# Patient Record
Sex: FEMALE | Race: WHITE
[De-identification: names, ages, dates, MRNs, and addresses within clinical notes are randomized per-mention and may not be internally consistent; named-entity substitution may affect disease eponyms.]

---

## 2017-05-16 ENCOUNTER — HOSPITAL ENCOUNTER (EMERGENCY)
Dept: HOSPITAL 47 - EC | Age: 77
Discharge: HOME | End: 2017-05-16
Payer: MEDICARE

## 2017-05-16 VITALS
RESPIRATION RATE: 16 BRPM | TEMPERATURE: 97 F | HEART RATE: 83 BPM | SYSTOLIC BLOOD PRESSURE: 133 MMHG | DIASTOLIC BLOOD PRESSURE: 72 MMHG
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
## 2017-05-16 DIAGNOSIS — Z79.899: ICD-10-CM

## 2017-05-16 DIAGNOSIS — S60.511A: ICD-10-CM

## 2017-05-16 DIAGNOSIS — S80.01XA: Primary | ICD-10-CM
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
## 2017-05-16 DIAGNOSIS — Z79.82: ICD-10-CM

## 2017-05-16 DIAGNOSIS — E78.5: ICD-10-CM

## 2017-05-16 DIAGNOSIS — I10: ICD-10-CM

## 2017-05-16 DIAGNOSIS — M19.90: ICD-10-CM

## 2017-05-16 DIAGNOSIS — S60.512A: ICD-10-CM

## 2017-05-16 DIAGNOSIS — S00.33XA: ICD-10-CM

## 2017-05-16 PROCEDURE — 99283 EMERGENCY DEPT VISIT LOW MDM: CPT

## 2017-05-16 NOTE — XR
EXAMINATION TYPE: XR knee complete RT

 

DATE OF EXAM: 5/16/2017 2:17 PM

 

CLINICAL HISTORY: pain

 

TECHNIQUE:  Three views of the right knee are obtained.

 

COMPARISON: None.

 

FINDINGS:  There is no acute fracture/dislocation.  The tri-compartment joint spaces appear moderatel
y narrowed. Meniscal chondrocalcinosis noted. Mild spur formation seen. The overlying soft tissue zee
ears unremarkable.

 

IMPRESSION:  There is no acute fracture or dislocation.ICD 10 NO FRACTURE, INITIAL EVALUATION

## 2017-05-16 NOTE — ED
Fall HPI





- General


Chief Complaint: Fall


Stated Complaint: Fall yesterday Rt knee and face


Time Seen by Provider: 05/16/17 14:34


Source: patient, RN notes reviewed


Mode of arrival: ambulatory





- History of Present Illness


Initial Comments: 





76-year-old female presents emergency Department with chief complaint of fall.  

She states that she tripped over an uneven sidewalk.  She states her is about a 

2 inch elevation.  She states she fell forward and has some abrasions to her 

hands but complaint of right knee pain, facial abrasion.  Patient states she 

didn't bite her upper lip with her teeth but states she does not feel any loose 

dentition.  She states that she had some minor bleeding.  Her tetanus is up-to-

date.  Patient states she had very minimal pain of her nose denies any headache

, dizziness, blurred vision.  Denies any neck or back pain at this time.  She 

states she has full range of motion of her wrist and minimally tender.  Patient 

states it is very painful to walk on her right knee though.





- Related Data


 Home Medications











 Medication  Instructions  Recorded  Confirmed


 


Aspirin [Adult Low Dose Aspirin EC] 81 mg PO DAILY 12/21/16 05/16/17


 


Atorvastatin [Lipitor] 20 mg PO AS DIRECTED 12/21/16 05/16/17


 


Cholecalciferol [Vitamin D3] 1,000 unit PO DAILY 12/21/16 05/16/17


 


Cyanocobalamin (Vitamin B-12) 2,000 mcg PO DAILY 12/21/16 05/16/17





[Vitamin B-12]   


 


Ibuprofen [Advil] 200 mg PO Q8HR PRN 12/21/16 05/16/17


 


Potassium Chloride [Klor-Con 20] 20 meq PO AS DIRECTED 12/21/16 05/16/17


 


Triamterene-Hctz 37.5-25Mg 1 cap PO DAILY 12/21/16 05/16/17





[Dyazide 37.5-25 Capsule]   


 


amLODIPine [Norvasc] 5 mg PO HS 12/21/16 05/16/17


 


Acetaminophen Tab [Tylenol Tab] 325 mg PO Q4H PRN 12/29/16 05/16/17











 Allergies











Allergy/AdvReac Type Severity Reaction Status Date / Time


 


No Known Allergies Allergy   Verified 05/16/17 13:53














Review of Systems


ROS Statement: 


Those systems with pertinent positive or pertinent negative responses have been 

documented in the HPI.





ROS Other: All systems not noted in ROS Statement are negative.





Past Medical History


Past Medical History: Hyperlipidemia, Hypertension, Osteoarthritis (OA), Sleep 

Apnea/CPAP/BIPAP


History of Any Multi-Drug Resistant Organisms: None Reported


Past Surgical History: Hysterectomy


Additional Past Surgical History / Comment(s): Colonoscopy


Past Anesthesia/Blood Transfusion Reactions: No Reported Reaction


Past Psychological History: No Psychological Hx Reported


Smoking Status: Never smoker


Past Alcohol Use History: Rare


Past Drug Use History: None Reported





- Past Family History


  ** Father


Family Medical History: Cancer





General Exam


Limitations: no limitations


General appearance: alert, in no apparent distress


Head exam: Present: atraumatic, normocephalic, normal inspection


Eye exam: Present: normal appearance, PERRL, EOMI.  Absent: scleral icterus, 

conjunctival injection, periorbital swelling


ENT exam: Present: normal oropharynx, mucous membranes moist, TM's normal 

bilaterally, normal external ear exam.  Absent: normal exam (Small abrasion 

noted on the nasal bridge with no tenderness)


Neck exam: Present: normal inspection, full ROM.  Absent: tenderness, 

meningismus, lymphadenopathy


Respiratory exam: Present: normal lung sounds bilaterally.  Absent: respiratory 

distress, wheezes, rales, rhonchi, stridor


Cardiovascular Exam: Present: regular rate, normal rhythm, normal heart sounds.

  Absent: systolic murmur, diastolic murmur, rubs, gallop, clicks


Extremities exam: Present: other (Bilateral wrists and hands neurovascular 

intact full range of motion minimal tenderness there is small abrasion noted no 

obvious deformity right knee there is tenderness along the patella and inferior 

to the patella with mild swelling no ecchymosis no abrasion patient full range 

of motion no laxity)


Back exam: Present: full ROM.  Absent: tenderness


Neurological exam: Present: alert, oriented X3, CN II-XII intact, reflexes 

normal.  Absent: motor sensory deficit





Course


 Vital Signs











  05/16/17





  13:48


 


Temperature 97.0 F L


 


Pulse Rate 83


 


Respiratory 16





Rate 


 


Blood Pressure 133/72


 


O2 Sat by Pulse 99





Oximetry 














Medical Decision Making





- Medical Decision Making





76-year-old female presented for fall.  Patient has no acute fracture or knee 

she had multiple contusions and abrasions.  Patientto head injury.  Patient be 

discharged at this time return parameters were discussed.





Disposition


Clinical Impression: 


 Fall, Nasal contusion, Knee contusion, Abrasion hand





Disposition: HOME SELF-CARE


Condition: Stable


Instructions:  Contusion in Adults (ED)


Additional Instructions: 


Please return to the Emergency Department if symptoms worsen or any other 

concerns.


Referrals: 


Loly Interiano DO [Primary Care Provider] - 1-2 days


Time of Disposition: 14:41

## 2017-12-28 ENCOUNTER — HOSPITAL ENCOUNTER (OUTPATIENT)
Dept: HOSPITAL 47 - RADMAMWWP | Age: 77
Discharge: HOME | End: 2017-12-28
Payer: MEDICARE

## 2017-12-28 DIAGNOSIS — Z12.31: Primary | ICD-10-CM

## 2017-12-28 PROCEDURE — 77063 BREAST TOMOSYNTHESIS BI: CPT

## 2017-12-29 NOTE — MM
Reason for exam: screening  (asymptomatic).

Last mammogram was performed 1 year ago.



History:

Patient is postmenopausal.

Took hormonal contraceptives for 2 years.



Physical Findings:

A clinical breast exam by your physician is recommended on an annual basis and 

results should be correlated with mammographic findings.



MG 3D Screening Mammo W/Cad

Bilateral CC and MLO view(s) were taken.

Prior study comparison: December 16, 2016, mammogram, performed at [facility].  December 14, 2015, mammogram, performed at [facility].

There are scattered fibroglandular densities.  No suspicious abnormality.  No 

significant changes when compared with prior studies.





ASSESSMENT: Negative, BI-RAD 1



RECOMMENDATION:

Routine screening mammogram of both breasts in 1 year.

## 2018-10-24 ENCOUNTER — HOSPITAL ENCOUNTER (OUTPATIENT)
Dept: HOSPITAL 47 - RADMRIMAIN | Age: 78
Discharge: HOME | End: 2018-10-24
Payer: MEDICARE

## 2018-10-24 DIAGNOSIS — M99.74: ICD-10-CM

## 2018-10-24 DIAGNOSIS — M51.27: ICD-10-CM

## 2018-10-24 DIAGNOSIS — M99.73: ICD-10-CM

## 2018-10-24 DIAGNOSIS — M47.817: ICD-10-CM

## 2018-10-24 DIAGNOSIS — M48.061: Primary | ICD-10-CM

## 2018-10-24 PROCEDURE — 72148 MRI LUMBAR SPINE W/O DYE: CPT

## 2018-10-24 NOTE — MR
EXAMINATION TYPE: MR lumbar spine wo con

 

DATE OF EXAM: 10/24/2018

 

COMPARISON: NONE

 

HISTORY: Sciatica, unspecified side per order, pain in lower back and both legs x8 months per patient
.

 

TECHNIQUE: Multiplanar, multisequence imaging of the lumbar spine is performed without IV contrast.

 

FINDINGS: Sagittal images of the lumbar spine show vertebral body heights and alignment to appear sat
isfactory. Multilevel disc desiccation is present. There is multilevel moderate to advanced disc spac
e narrowing. Multilevel small posterior disc herniations are seen on sagittal images. The conus medul
julius is normal in position and signal ending at mid L1 level. Heterogeneous predominantly Modic type
 II endplate changes with moderate multilevel anterior spurring is present, scattered small hemangiom
as are also noted.

 

Axial images at the T12-L1 level is felt to appear within normal limits.

 

Axial images at L1-L2 level shows mild/moderate broad-based posterior disc protrusion mildly effacing
 the anterior thecal sac and causing moderate left and mild right-sided neural foraminal narrowing.

 

Axial images at the L2-L3 level mild/moderate broad-based disc bulge mildly effacing the anterior the
helen sac and causing mild bilateral anterior inferior neural foraminal narrowing.

 

Axial images at the L3-L4 level shows mild to moderate right greater than left facet degenerative luke
nge and ligament flavum hypertrophy with mild to moderate broad-based posterior disc protrusion mildl
y effacing the anterior thecal sac and causing moderate to advanced right and mild to moderate left-s
ided neural foraminal narrowing. Encroachment of right L3 nerve is suspected on axial image 14 and sa
gittal image 11.

 

Axial images at L4-L5 level shows moderate facet degenerative changes and ligamentum flavum hypertrop
hy. There is prominence of epidural fat at this level. There is broad disc bulge with central disc pr
otrusion seen. There is moderate to severe bilateral neural foraminal narrowing at this level noted.

 

Axial images at L5-S1 level shows moderate facet degenerative changes bilaterally. There is broad-bas
ed disc bulge seen but spinal canal is more prominent with diminished epidural fat. There is mild to 
moderate bilateral neural foraminal narrowing at this level.

 

There are suspected prominent parapelvic cysts centrally in both kidneys and partial visualization of
 greater than 1 cm cystic lesion upper right pelvis axial image 2.

 

IMPRESSION:

1. Multilevel degenerative changes in the lumbar spine as detailed above. Some epidural lipomatosis n
oted lower lumbar levels.

2. Partial visualization of greater than 1 cm cystic upper right pelvis lesion presumed right ovarian
 in etiology, this is abnormal finding in postmenopausal female and further investigation with pelvic
 ultrasound is advised.

## 2019-09-04 ENCOUNTER — HOSPITAL ENCOUNTER (OUTPATIENT)
Dept: HOSPITAL 47 - RADMAMWWP | Age: 79
Discharge: HOME | End: 2019-09-04
Attending: FAMILY MEDICINE
Payer: MEDICARE

## 2019-09-04 DIAGNOSIS — Z12.31: Primary | ICD-10-CM

## 2019-09-04 PROCEDURE — 77063 BREAST TOMOSYNTHESIS BI: CPT

## 2019-09-04 PROCEDURE — 77067 SCR MAMMO BI INCL CAD: CPT

## 2019-09-05 NOTE — MM
Reason for exam: screening  (asymptomatic).

Last mammogram was performed 1 year and 8 months ago.



History:

Patient is postmenopausal.

Took hormonal contraceptives for 2 years.



Physical Findings:

A clinical breast exam by your physician is recommended on an annual basis and 

results should be correlated with mammographic findings.



MG 3D Screening Mammo W/Cad

Bilateral CC and MLO view(s) were taken.

Prior study comparison: December 28, 2017, bilateral MG 3d screening mammo w/cad. 

December 16, 2016, mammogram, performed at [facility].

The breast tissue is heterogeneously dense. This may lower the sensitivity of 

mammography.  Benign appearing bilateral calcifications. New 3mm group of 

calcifications 5.5cm from nipple in the central upper right breast at middle 

depth. No suspicious abnormality.





ASSESSMENT: Incomplete: need additional imaging evaluation, BI-RAD 0



RECOMMENDATION:

Special view mammogram of the right breast.



[facility] will attempt to contact patient to return for supplemental 

views.

## 2019-09-20 ENCOUNTER — HOSPITAL ENCOUNTER (OUTPATIENT)
Dept: HOSPITAL 47 - RADMAMWWP | Age: 79
Discharge: HOME | End: 2019-09-20
Attending: FAMILY MEDICINE
Payer: MEDICARE

## 2019-09-20 DIAGNOSIS — R92.8: Primary | ICD-10-CM

## 2019-09-20 PROCEDURE — 77065 DX MAMMO INCL CAD UNI: CPT

## 2019-09-20 PROCEDURE — 77061 BREAST TOMOSYNTHESIS UNI: CPT

## 2019-09-23 NOTE — MM
Reason for exam: additional evaluation requested from abnormal screening.

Last mammogram was performed 1 month ago.



History:

Patient is postmenopausal and history of other cancer.

Took hormonal contraceptives for 2 years.



Physical Findings:

Nurse did not find any significant physical abnormalities on exam.



MG 3D Work Up W/Cad RT

CC with magnification, LM with magnification, and LM view(s) were taken of the 

right breast.

Prior study comparison: September 4, 2019, bilateral MG 3d screening mammo w/cad. 

December 28, 2017, bilateral MG 3d screening mammo w/cad.

Finding: There are grouped/clustered, fine calcifications in the middle position 

of the right breast.

New finding since December 28, 2017.



These results were verbally communicated with the patient and result sheet given 

to the patient on 9/20/19.





ASSESSMENT: Suspicious, BI-RAD 4



RECOMMENDATION:

Stereotactic core biopsy of the right breast.



Called Dr. Interiano with mammographic findings and has scheduled an appointment for

the patient for 10/17/19 at 4:00 with Dr. Vines.

Biopsy scheduled for 10/31/19 at 8:00.



PRELIMINARY REPORT CALLED AND FAXED TO DR. VINES ON 9/20/19.

## 2019-10-17 ENCOUNTER — HOSPITAL ENCOUNTER (OUTPATIENT)
Dept: HOSPITAL 47 - WWCWWP | Age: 79
Discharge: HOME | End: 2019-10-17
Attending: SURGERY
Payer: MEDICARE

## 2019-10-17 VITALS — BODY MASS INDEX: 32.8 KG/M2

## 2019-10-17 VITALS
RESPIRATION RATE: 18 BRPM | HEART RATE: 87 BPM | SYSTOLIC BLOOD PRESSURE: 119 MMHG | DIASTOLIC BLOOD PRESSURE: 72 MMHG | TEMPERATURE: 97.9 F

## 2019-10-17 DIAGNOSIS — Z53.9: Primary | ICD-10-CM

## 2019-10-17 NOTE — P.GSHP
History of Present Illness


H&P Date: 10/17/19


Chief Complaint: mammographic abnormality right breast





 Lori is a 79-year-old white female who had a routine screening mammogram 

performed on 9419.  She was called back to have additional views of the right 

breast and these were performed on [address].  A group of fine calcifications in 

the middle position of the right breast were noted.  These were felt to be 

suspicious and stereotactic core biopsy was recommended.  The patient does not 

feel anything of concern in her breasts.  She has no history of any trauma or 

infection in the breast.  She has no complaints of any lumps or masses in her 

breasts.  No abnormal nipple discharge.





Family history:


1. father: skin melanoma and bony cancer


2. patient: skin cancer, basal and SCC


3. maternal cousin: breast cancer 





Hormonal History:


menarche: 14


, 1 miscarriage


breast fed: none


age at first birth: 20


menopause: hysterectomy left one ovary, no cancer


BCP: 4 years


hormones: 2 years








Surgical history:


1.  Hysterectomy 1 ovary removed one left 








medical history:


1. HTN


2. high cholesterol


3. arthritis





Social history:


Smoke: Negative:


Alcohol: Occasional


Drugs: Negative

















- Constitutional


Constitutional: Denies chills, Denies fever





- EENT


Comment: 





wears glasses


Ears: deny: decreased hearing, tinnitus


Ears, nose, mouth and throat: Denies headache, Denies sore throat





- Breasts


Breasts: bilateral: as per HPI





- Cardiovascular


Cardiovascular: Reports high blood pressure, Denies chest pain, Denies shortness

of breath





- Respiratory


Respiratory: Denies cough, Denies 7





- Gastrointestinal


Gastrointestinal: Denies abdominal pain, Denies diarrhea, Denies nausea, Denies 

vomiting





- Genitourinary (Female)


Genitourinary: Denies dysuria, Denies hematuria





- Menstruation


Menstruation: Reports post hysterectomy





- Musculoskeletal


Comment: 





arthritis





- Integumentary


Integumentary: Denies pruritus, Denies rash





- Neurological


Neurological: Denies numbness, Denies weakness





- Psychiatric


Psychiatric: Denies anxiety, Denies depression





- Endocrine


Endocrine: Denies fatigue, Denies weight change





- Hematologic/Lymphatic


Comment: 


baby aspirin





- Allergic/Immunologic


Allergic/Immunologic: Reports as per HPI





Past Medical History


Past Medical History: Hyperlipidemia, Hypertension, Osteoarthritis (OA), Sleep 

Apnea/CPAP/BIPAP


History of Any Multi-Drug Resistant Organisms: None Reported


Past Surgical History: Hysterectomy


Additional Past Surgical History / Comment(s): Colonoscopy


Past Anesthesia/Blood Transfusion Reactions: No Reported Reaction


Past Psychological History: No Psychological Hx Reported


Smoking Status: Never smoker


Past Alcohol Use History: Rare


Past Drug Use History: None Reported





- Past Family History


  ** Father


Family Medical History: Cancer





Medications and Allergies


                                Home Medications











 Medication  Instructions  Recorded  Confirmed  Type


 


Aspirin [Adult Low Dose Aspirin EC] 81 mg PO DAILY 12/21/16 10/17/19 History


 


Atorvastatin [Lipitor] 20 mg PO AS DIRECTED 12/21/16 10/17/19 History


 


Cholecalciferol [Vitamin D3] 1,000 unit PO DAILY 12/21/16 10/17/19 History


 


Cyanocobalamin (Vitamin B-12) 2,000 mcg PO DAILY 12/21/16 10/17/19 History





[Vitamin B-12]    


 


Ibuprofen [Advil] 200 mg PO Q8HR PRN 12/21/16 10/17/19 History


 


Potassium Chloride [Klor-Con 20] 20 meq PO AS DIRECTED 12/21/16 10/17/19 History


 


amLODIPine [Norvasc] 10 mg PO HS 12/21/16 10/17/19 History


 


Acetaminophen Tab [Tylenol Tab] 325 mg PO Q4H PRN 12/29/16 10/17/19 History


 


Amoxicillin 500 mg PO QID 10/17/19 10/17/19 History


 


Losartan/Hydrochlorothiazide 1 tab PO DAILY 10/17/19 10/17/19 History





[Losartan-Hctz 100-25 mg Tab]    








                                    Allergies











Allergy/AdvReac Type Severity Reaction Status Date / Time


 


No Known Allergies Allergy   Verified 10/17/19 16:03














Surgical - Exam


                                   Vital Signs











Temp Pulse Resp BP Pulse Ox


 


 97.9 F   87   18   119/72   95 


 


 10/17/19 16:00  10/17/19 16:00  10/17/19 16:00  10/17/19 16:00  10/17/19 16:00














BMI 32.9





- General


well developed, well nourished, no distress





- Eyes


normal ocular movement, no icteric





- ENT


no hearing loss, no congestion





- Neck


no masses, trachea midline





- Respiratory


normal expansion, normal respiratory effort, clear to auscultation





- Cardiovascular


Rhythm: regular


Heart Sounds: normal: S1, S2





- Abdomen


Abdomen: soft, non tender, no guarding, no rigid, no rebound





- Integumentary





ormal turgor





- Neurologic


no disoriented, no combative





- Musculoskeletal


normal gait, normal posture





- Psychiatric


oriented to time, oriented to person, oriented to place, speech is normal, 

memory intact





Breast examination:


Right breast: Multi-positional exam fibrocystic changes no dominant masses or 

nodules of concern


Right axilla: No adenopathy of concern


Left breast: Multiple positional exam no dominant masses or nodules of concern


Left axilla: No adenopathy of concern





Results





Mammogram results reviewed





Assessment and Plan


Assessment: 





Impression:


1. HTN


2. high cholesterol


3. arthritis


4.  Fibrocystic breast changes


5.  Mammographic abnormality right breast


6.  Family history of breast cancer


7.  Family history of cancer





Plan:


1.  Stereotactic core biopsy right breast


2.  Medical management of medical conditions





Lori had an infection near a left posterior molar.  She has been seen by a 

dentist lanced it and put on an antibiotic.  She states she is going for suture 

removal next week and she will be completed with her antibiotics.  We have 

talked about if this is active infection would consider postponing the core 

biopsy.  Additionally we have talked about stopping her baby aspirin which she 

will do a week prior to the procedure.





Risks and benefits of procedure discussed with the patient and she wishes to 

proceed.


Cc:Dr. Interiano

## 2019-10-31 ENCOUNTER — HOSPITAL ENCOUNTER (OUTPATIENT)
Dept: HOSPITAL 47 - RADMAMWWP | Age: 79
Discharge: HOME | End: 2019-10-31
Attending: SURGERY
Payer: MEDICARE

## 2019-10-31 VITALS — RESPIRATION RATE: 16 BRPM

## 2019-10-31 VITALS — BODY MASS INDEX: 32.3 KG/M2

## 2019-10-31 VITALS — SYSTOLIC BLOOD PRESSURE: 131 MMHG | HEART RATE: 59 BPM | DIASTOLIC BLOOD PRESSURE: 71 MMHG | TEMPERATURE: 98.1 F

## 2019-10-31 DIAGNOSIS — N60.11: Primary | ICD-10-CM

## 2019-10-31 PROCEDURE — 19081 BX BREAST 1ST LESION STRTCTC: CPT

## 2019-10-31 PROCEDURE — 88305 TISSUE EXAM BY PATHOLOGIST: CPT

## 2019-10-31 NOTE — P.OP
Date of Procedure: 10/31/19


Preoperative Diagnosis: 


Mammographic abnormality right breast


Postoperative Diagnosis: 


same


Procedure(s) Performed: 


Right breast stereotactic core biopsy


Anesthesia: local


Surgeon: Sandra Mccray


Estimated Blood Loss (ml): 0.02


Pathology: other (Breast tissue)


Condition: stable


Disposition: same day


Indications for Procedure: 


right breast grouped clustered fine calcifications in the middle position


Operative Findings: 


Calcifications noted in specimen


Description of Procedure: 


The patient is a 79-year-old white female who on a mammogram was noted to have 

fine group/clustered calcifications in the middle position of the right breast. 

The patient was recommended to undergo stereotactic core biopsy.  Risks and 

benefits of the procedure were discussed with the patient and she wished to 

proceed.


The patient was taken to the stereotactic core room.  She was positioned on the 

low.  Table and a  film was obtained.  The views were obtained in the CC 

from above position.  The area of concern was identified.  The patient was 

targeted.  The breast was prepped using Betadine.  20 mL of 1% lidocaine were 

used to anesthetize the area.  Needle which was a 9-gauge vacuum-assisted core 

biopsy needle was driven to the correct coordinates.  The needle was fired.  

Post fire films were obtained.  The needle was noted to be in the correct 

location.  5 core biopsies were obtained.  The 12 o'clock position.  Radiograph 

of the specimen revealed that the area of concern had been sampled with 

calcifications in the specimen.  A Top-Hat secure blair clip was placed for 

localization.  The patient tolerated the procedure in stable condition.  The 

specimen was sent to pathology.


Cc: Dr. Loly Interiano

## 2019-10-31 NOTE — MM
EXAMINATION TYPE: MG stereo VAD BX RT

 

DATE OF EXAM: 10/31/2019

 

COMPARISON: 9/4/2019 and 9/20/2019

 

CLINICAL HISTORY: 79-year-old female with right breast microcalcifications

 

TECHNIQUE: Stereotactic guided core biopsy of the right breast.  

 

FINDINGS: The procedure of stereotactic guided core biopsy was explained to the 
patient.  Benefits, alternatives, and risks were discussed.  An informed consent
was then obtained.  

 

The shortness pathway for biopsy was chosen.  Shortness pathway was a superior 
approach. I performed the localization, then surgeon, Dr. Mp Rich performed 
the remainder of the procedure.  A vacuum assisted biopsy gun was used to obtain
multiple core samples.   

 

The patient tolerated the procedure well without any immediate complication.  
The patient was kept in the radiology department for short stay after the 
procedure and then discharged home in stable condition.  Targeted calcifications
are identified in specimen mammogram.  Post biopsy mammogram shows the clip to 
appear in satisfactory position relative to the targeted area of concern on the 
preprocedure images.  

 

 

 

IMPRESSION: 

 

SUCCESSFUL, UNCOMPLICATED STEREOTACTIC GUIDED CORE BIOPSY OF CENTRAL 12:00 RIGHT
BREAST MICROCALCIFICATIONS. LOW TO INTERMEDIATE SUSPICION. FULL PATHOLOGY 
RESULTS TO FOLLOW.  

 

Pathology Results: Benign

 

RIGHT BREAST, STEREOTACTIC NEEDLE CORE BIOPSY:  Fibrocystic changes including 
cysts with intraluminal calcium oxalate crystals and fibrosis. 



Recommendation

Follow up mammogram of the right breast in 6 months.

WALTER

## 2019-11-15 ENCOUNTER — HOSPITAL ENCOUNTER (OUTPATIENT)
Dept: HOSPITAL 47 - WWCWWP | Age: 79
End: 2019-11-15
Attending: SURGERY
Payer: MEDICARE

## 2019-11-15 VITALS
RESPIRATION RATE: 18 BRPM | TEMPERATURE: 97.6 F | HEART RATE: 62 BPM | SYSTOLIC BLOOD PRESSURE: 126 MMHG | DIASTOLIC BLOOD PRESSURE: 68 MMHG

## 2019-11-15 VITALS — BODY MASS INDEX: 31.8 KG/M2

## 2019-11-15 DIAGNOSIS — Z53.9: Primary | ICD-10-CM

## 2019-11-15 NOTE — P.PN
Subjective


Progress Note Date: 11/15/19


Principal diagnosis: 





results of sterocore biopsy





The patient is a 79-year-old white female status post a right stereotactic core 

biopsy on 103 [id number].  Pathology revealed fibrocystic changes including cyst with

intraluminal calcium crystals and fibrosis.  It was felt that this was 

concordant with the area of concern.  The patient post procedure has no 

complaints.  She is doing well.





Objective





- Vital Signs


Vital signs: 


                                   Vital Signs











Temp  97.6 F   11/15/19 10:46


 


Pulse  62   11/15/19 10:46


 


Resp  18   11/15/19 10:46


 


BP  126/68   11/15/19 10:46


 


Pulse Ox  97   11/15/19 10:46








                                 Intake & Output











 11/14/19 11/15/19 11/15/19





 18:59 06:59 18:59


 


Weight   78.925 kg














- Exam





BMI 31.8





- Constitutional


General appearance: Present: obese





- EENT


Eyes: Present: EOMI


ENT: Present: hearing grossly normal





- Neck


Neck: Present: normal ROM





- Respiratory


Respiratory: bilateral: CTA





- Cardiovascular


Rhythm: regular


Heart sounds: normal: S1, S2





- Integumentary


Integumentary: Present: normal turgor





- Musculoskeletal


Musculoskeletal: Present: gait normal





- Psychiatric


Psychiatric: Present: A&O x's 3, appropriate affect





- Additional findings


Additional findings: 





Right breast biopsy site clean and dry


No evidence of infection


No ecchymosis or hematoma





Assessment and Plan


Assessment: 





Impression:


1.  Patient status post right breast stereotactic core biopsy pathology benign 

this is concordant





Plan:


1.  Right breast mammogram in 6 months time with physician exam at that time


2.  Will follow with bilateral mammogram and normal interval which would be 

September 2020.





CC: Dr. Interiano

## 2019-12-14 ENCOUNTER — HOSPITAL ENCOUNTER (OUTPATIENT)
Dept: HOSPITAL 47 - LABWHC1 | Age: 79
Discharge: HOME | End: 2019-12-14
Attending: NURSE PRACTITIONER
Payer: MEDICARE

## 2019-12-14 DIAGNOSIS — K52.9: Primary | ICD-10-CM

## 2019-12-14 LAB
ALBUMIN SERPL-MCNC: 4.1 G/DL (ref 3.8–4.9)
ALBUMIN/GLOB SERPL: 2.05 G/DL (ref 1.6–3.17)
ALP SERPL-CCNC: 70 U/L (ref 41–126)
ALT SERPL-CCNC: 15 U/L (ref 8–44)
ANION GAP SERPL CALC-SCNC: 10.4 MMOL/L (ref 4–12)
AST SERPL-CCNC: 15 U/L (ref 13–35)
BUN SERPL-SCNC: 14 MG/DL (ref 9–27)
BUN/CREAT SERPL: 20 RATIO (ref 12–20)
CALCIUM SPEC-MCNC: 9.5 MG/DL (ref 8.7–10.3)
CHLORIDE SERPL-SCNC: 107 MMOL/L (ref 96–109)
CO2 SERPL-SCNC: 26.6 MMOL/L (ref 21.6–31.8)
ERYTHROCYTE [DISTWIDTH] IN BLOOD BY AUTOMATED COUNT: 4.22 M/UL (ref 3.8–5.4)
ERYTHROCYTE [DISTWIDTH] IN BLOOD: 12.6 % (ref 11.5–15.5)
GLOBULIN SER CALC-MCNC: 2 G/DL (ref 1.6–3.3)
GLUCOSE SERPL-MCNC: 95 MG/DL (ref 70–110)
HCT VFR BLD AUTO: 40.3 % (ref 34–46)
HGB BLD-MCNC: 13.1 GM/DL (ref 11.4–16)
MCH RBC QN AUTO: 31.1 PG (ref 25–35)
MCHC RBC AUTO-ENTMCNC: 32.6 G/DL (ref 31–37)
MCV RBC AUTO: 95.6 FL (ref 80–100)
PLATELET # BLD AUTO: 299 K/UL (ref 150–450)
POTASSIUM SERPL-SCNC: 4.1 MMOL/L (ref 3.5–5.5)
PROT SERPL-MCNC: 6.1 G/DL (ref 6.2–8.2)
SODIUM SERPL-SCNC: 144 MMOL/L (ref 135–145)
WBC # BLD AUTO: 5.9 K/UL (ref 3.8–10.6)

## 2019-12-14 PROCEDURE — 87329 GIARDIA AG IA: CPT

## 2019-12-14 PROCEDURE — 83516 IMMUNOASSAY NONANTIBODY: CPT

## 2019-12-14 PROCEDURE — 83630 LACTOFERRIN FECAL (QUAL): CPT

## 2019-12-14 PROCEDURE — 87046 STOOL CULTR AEROBIC BACT EA: CPT

## 2019-12-14 PROCEDURE — 87045 FECES CULTURE AEROBIC BACT: CPT

## 2019-12-14 PROCEDURE — 85652 RBC SED RATE AUTOMATED: CPT

## 2019-12-14 PROCEDURE — 86140 C-REACTIVE PROTEIN: CPT

## 2019-12-14 PROCEDURE — 87328 CRYPTOSPORIDIUM AG IA: CPT

## 2019-12-14 PROCEDURE — 85027 COMPLETE CBC AUTOMATED: CPT

## 2019-12-14 PROCEDURE — 36415 COLL VENOUS BLD VENIPUNCTURE: CPT

## 2019-12-14 PROCEDURE — 80053 COMPREHEN METABOLIC PANEL: CPT

## 2019-12-14 PROCEDURE — 83993 ASSAY FOR CALPROTECTIN FECAL: CPT

## 2019-12-16 LAB
GLIADIN IGA SER-ACNC: <0.2 U/ML
GLIADIN PEPTIDE IGA SER-ACNC: NEGATIVE
GLIADIN PEPTIDE IGG SER-ACNC: NEGATIVE

## 2020-09-21 ENCOUNTER — HOSPITAL ENCOUNTER (OUTPATIENT)
Dept: HOSPITAL 47 - RADMAMWWP | Age: 80
Discharge: HOME | End: 2020-09-21
Attending: SURGERY
Payer: MEDICARE

## 2020-09-21 DIAGNOSIS — R92.8: Primary | ICD-10-CM

## 2020-09-21 PROCEDURE — 77066 DX MAMMO INCL CAD BI: CPT

## 2020-09-21 PROCEDURE — 77062 BREAST TOMOSYNTHESIS BI: CPT

## 2020-09-21 NOTE — MM
Reason for exam: additional evaluation requested from prior study.

Last mammogram was performed 1 year ago.



History:

Patient is postmenopausal and history of other cancer.

Benign MG stereo VAD BX RT of the right breast, October 31, 2019.

Took hormonal contraceptives for 2 years.



Physical Findings:

Nurse did not find any significant physical abnormalities on exam.



MG 3D Diag Mammo W/Cad SIM

Bilateral CC and MLO view(s) were taken.

Prior study comparison: September 20, 2019, right breast MG 3d work up w/cad RT.  

September 4, 2019, bilateral MG 3d screening mammo w/cad.

There are scattered fibroglandular densities.  Previous mammotome biopsy in the 

right breast. There is chronic nodularity in the left breast anterior superior 

position. Stable benign vascular calcifications. Subareolar asymmetric density 

left MLO view disperses on spot 3D.

No significant new findings when compared with previous films.



These results were verbally communicated with the patient and result sheet given 

to the patient on 9/21/20.





ASSESSMENT: Negative, BI-RAD 1



RECOMMENDATION:

Routine screening mammogram of both breasts in 1 year.

## 2021-04-15 ENCOUNTER — HOSPITAL ENCOUNTER (OUTPATIENT)
Dept: HOSPITAL 47 - RADPROMAIN | Age: 81
Discharge: HOME | End: 2021-04-15
Attending: FAMILY MEDICINE
Payer: MEDICARE

## 2021-04-15 VITALS — DIASTOLIC BLOOD PRESSURE: 66 MMHG | SYSTOLIC BLOOD PRESSURE: 123 MMHG | RESPIRATION RATE: 18 BRPM

## 2021-04-15 VITALS — HEART RATE: 78 BPM | TEMPERATURE: 97.7 F

## 2021-04-15 DIAGNOSIS — E04.1: Primary | ICD-10-CM

## 2021-04-15 PROCEDURE — 10005 FNA BX W/US GDN 1ST LES: CPT

## 2021-04-15 PROCEDURE — 88305 TISSUE EXAM BY PATHOLOGIST: CPT

## 2021-04-15 PROCEDURE — 88173 CYTOPATH EVAL FNA REPORT: CPT

## 2021-10-12 ENCOUNTER — HOSPITAL ENCOUNTER (OUTPATIENT)
Dept: HOSPITAL 47 - RADMAMWWP | Age: 81
Discharge: HOME | End: 2021-10-12
Attending: FAMILY MEDICINE
Payer: MEDICARE

## 2021-10-12 DIAGNOSIS — Z12.31: Primary | ICD-10-CM

## 2021-10-12 DIAGNOSIS — Z78.0: ICD-10-CM

## 2021-10-12 PROCEDURE — 77067 SCR MAMMO BI INCL CAD: CPT

## 2021-10-12 PROCEDURE — 77063 BREAST TOMOSYNTHESIS BI: CPT

## 2021-10-14 NOTE — MM
Reason for exam: screening  (asymptomatic).

Last mammogram was performed 1 year and 1 month ago.



History:

Patient is postmenopausal and history of other cancer.

Benign MG stereo VAD BX RT of the right breast, October 31, 2019.

Took hormonal contraceptives for 2 years.



Physical Findings:

A clinical breast exam by your physician is recommended on an annual basis and 

results should be correlated with mammographic findings.



MG 3D Screening Mammo W/Cad

Bilateral CC and MLO view(s) were taken.

Prior study comparison: September 21, 2020, bilateral MG 3d diag mammo w/cad SIM. 

September 4, 2019, bilateral MG 3d screening mammo w/cad.  December 28, 2017, 

bilateral MG 3d screening mammo w/cad.

There are scattered fibroglandular densities.  There are benign appearing vascular

calcifications bilaterally.  No significant changes when compared with prior 

studies.





ASSESSMENT: Benign, BI-RAD 2



RECOMMENDATION:

Routine screening mammogram of both breasts in 1 year.

## 2022-10-13 ENCOUNTER — HOSPITAL ENCOUNTER (OUTPATIENT)
Dept: HOSPITAL 47 - RADMAMWWP | Age: 82
Discharge: HOME | End: 2022-10-13
Attending: FAMILY MEDICINE
Payer: MEDICARE

## 2022-10-13 DIAGNOSIS — Z12.31: Primary | ICD-10-CM

## 2022-10-13 DIAGNOSIS — Z80.3: ICD-10-CM

## 2022-10-13 DIAGNOSIS — Z78.0: ICD-10-CM

## 2022-10-13 PROCEDURE — 77067 SCR MAMMO BI INCL CAD: CPT

## 2022-10-13 PROCEDURE — 77063 BREAST TOMOSYNTHESIS BI: CPT

## 2022-10-13 NOTE — US
EXAMINATION TYPE: US FNA thyroid first lesion

 

DATE OF EXAM: 4/15/2021

 

COMPARISON: NONE

 

HISTORY: Thyroid nodule right lobe thyroid gland.

 

Maximal barrier technique was utilized.  After informed consent, skin overlying the lobe thyroid nodu
le was localized with ultrasound and the overlying skin prepped and draped. Ultrasound was utilized u
sing sterile technique. Lidocaine was used for local anesthesia.  Five passes with a 25-gauge needle 
were made into the nodule and aspirated specimen was submitted to cytology.  Following the procedure 
hemostasis achieved.  No immediate complication.  The patient discharged in stable condition.

 

IMPRESSION: STATUS POST ULTRASOUND GUIDED FINE NEEDLE ASPIRATION OF THYROID NODULE, PATHOLOGY IS PEND
ING.  THIS PROCEDURE WAS PERFORMED BY THE UNDERSIGNED.

## 2022-10-14 NOTE — MM
Reason for Exam: Screening  (asymptomatic). 

Last screening mammogram was performed 12 month(s) ago.





Patient History: 

Menarche at age 14. First Full-Term Pregnancy at age 19. Left ovary removed at age 32. Hysterectomy

at age 32. Postmenopausal. Other cancer. Patient used Hormonal Contraceptives for 2 years.

Unspecified Hormone, from age 39 until age 46. 10/31/2019, Benign Core Biopsy on the right side.

Paternal cousin had breast cancer, age 60. Maternal cousin had breast cancer, age 60. Daughter had

breast cancer, age 56. 





Risk Values: 

Lili 5 year model risk: 3.5%.

NCI Lifetime model risk: 5.1%.





Prior Study Comparison: 

9/20/2019 Right Diagnostic Mammogram, [facility]. 9/21/2020 Bilateral Diagnostic Mammogram, [facility]. 10/12/2021

Bilateral Screening Mammogram, [facility]. 





Tissue Density: 

The breast tissue is heterogeneously dense. This may lower the sensitivity of mammography.





Findings: 

Analyzed By CAD. 

Pattern appears symmetrical. Benign vascular calcification is present bilaterally. A core marker is

within the right breast.

No suspicious groups of microcalcifications, spiculated or lobular masses, architectural distortion

or other secondary signs of malignancy are mammographically apparent. 





Overall Assessment: Benign, BI-RAD 2





Management: 

Screening Mammogram of both breasts in 1 year.

A negative mammogram report should not preclude additional follow up of suspicious palpable

abnormalities.

Patient should continue monthly self breast exam.

A clinical breast exam by your physician is recommended on an annual basis and results should be

correlated with mammographic findings.



Electronically signed and approved by: Francisco Valdez D.O. Radiologis

## 2023-10-17 ENCOUNTER — HOSPITAL ENCOUNTER (OUTPATIENT)
Dept: HOSPITAL 47 - RADMAMWWP | Age: 83
Discharge: HOME | End: 2023-10-17
Attending: FAMILY MEDICINE
Payer: MEDICARE

## 2023-10-17 DIAGNOSIS — Z80.3: ICD-10-CM

## 2023-10-17 DIAGNOSIS — Z12.31: Primary | ICD-10-CM

## 2023-10-17 DIAGNOSIS — Z78.0: ICD-10-CM

## 2023-10-17 PROCEDURE — 77063 BREAST TOMOSYNTHESIS BI: CPT

## 2023-10-17 PROCEDURE — 77067 SCR MAMMO BI INCL CAD: CPT

## 2023-10-18 NOTE — MM
Reason for Exam: Screening  (asymptomatic). 

Last screening mammogram was performed 12 month(s) ago.





Patient History: 

Menarche at age 14. First Full-Term Pregnancy at age 19. Left ovary removed at age 32. Hysterectomy

at age 32. Postmenopausal. Patient used Hormonal Contraceptives for 2 years. Unspecified Hormone,

from age 39 until age 46. 10/31/2019, Benign Core Biopsy on the right side.

Paternal cousin had breast cancer, age 60. Maternal cousin had breast cancer, age 60. Daughter had

breast cancer, age 56. 





Risk Values: 

Lili 5 year model risk: 3.4%.

NCI Lifetime model risk: 4.6%.





Prior Study Comparison: 

9/21/2020 Bilateral Diagnostic Mammogram, [facility]. 10/12/2021 Bilateral Screening Mammogram, [facility].

10/13/2022 Bilateral MG 3D screening mammo w/cad, [facility]. 





Tissue Density: 

There are scattered fibroglandular densities.





Findings: 

Analyzed By CAD. 

Microclip right breast from prior biopsy. Benign bilateral vascular calcifications redemonstrated.

There is no suspicious group of microcalcifications or new suspicious mass in either breast. 





Overall Assessment: Benign, BI-RAD 2





Management: 

Screening Mammogram of both breasts in 1 year.

See note below in regards to patient's increased five-year Lili score.



Patient should continue monthly self-breast exams.  A clinical breast exam by your physician is

recommended on an annual basis.

This exam should not preclude additional follow-up of suspicious palpable abnormalities.



Note on Lili scores and lifetime risk:

1. A Lili score greater than 3% is considered moderate risk. If this is the case, consider

specialist referral to assess eligibility for a risk reducing agent.

2. If overall lifetime risk for the development of breast cancer is 20% or higher, the patient may

qualify for future screening with alternating mammogram and breast MRI.



Electronically signed and approved by: Adryan Glasgow M.D. Radiologist

## 2023-11-07 ENCOUNTER — HOSPITAL ENCOUNTER (OUTPATIENT)
Dept: HOSPITAL 47 - RADCTMAIN | Age: 83
Discharge: HOME | End: 2023-11-07
Attending: FAMILY MEDICINE
Payer: MEDICARE

## 2023-11-07 DIAGNOSIS — I70.0: ICD-10-CM

## 2023-11-07 DIAGNOSIS — R91.8: ICD-10-CM

## 2023-11-07 DIAGNOSIS — M19.011: ICD-10-CM

## 2023-11-07 DIAGNOSIS — E04.1: ICD-10-CM

## 2023-11-07 DIAGNOSIS — I25.10: ICD-10-CM

## 2023-11-07 DIAGNOSIS — I71.20: Primary | ICD-10-CM

## 2023-11-07 DIAGNOSIS — N20.0: ICD-10-CM

## 2023-11-07 DIAGNOSIS — M19.012: ICD-10-CM

## 2023-11-07 LAB — BUN SERPL-SCNC: 15 MG/DL (ref 7–17)

## 2023-11-07 PROCEDURE — 82565 ASSAY OF CREATININE: CPT

## 2023-11-07 PROCEDURE — 36415 COLL VENOUS BLD VENIPUNCTURE: CPT

## 2023-11-07 PROCEDURE — 71260 CT THORAX DX C+: CPT

## 2023-11-07 PROCEDURE — 84520 ASSAY OF UREA NITROGEN: CPT

## 2023-11-08 NOTE — CT
EXAMINATION TYPE: CT chest w con

 

DATE OF EXAM: 11/7/2023

 

COMPARISON: None

 

HISTORY: States that she has an thoracic aneurysm

 

CT DLP: 393.9 mGycm

Automated exposure control for dose reduction was used.

 

TECHNIQUE: 

CT scan of the chest is performed with IV Contrast, patient injected with 100 cc mL of Isovue 300.  M
IP Images are created on CT scanner and reviewed. 3D reconstructed images are created on an [employer] workstation and reviewed.

 

FINDINGS:

 

LUNGS: The lungs are grossly clear, there is no concerning parenchymal mass or nodule identified.   T
here is no pleural effusion or pneumothorax seen.  The tracheobronchial tree is patent. Subsegmental 
changes of atelectasis. There is subpleural micronodules measuring 3 mm or less bilaterally most like
ly on the basis of benign nodules. 

 

MEDIASTINUM: There are no greater than 1 cm hilar or mediastinal lymph nodes.   No pericardial effusi
on is seen.  Coronary artery calcification. Atherosclerotic changes aorta with no evidence of aneurys
m. Aorta measures a maximal dimension of 3.5 cm. There is calcification of the aortic valve.

 

OTHER:  There is a right lobe thyroid nodule measuring 1.1 cm. There is a fluid attenuation near the 
right shoulder joint possibly within the bursa. Severe arthropathy of the glenohumeral joints bilater
ally. Small hiatal hernia. Bilateral parapelvic renal cysts and simple exophytic 1.2 cm left renal cy
st. Hypertrophic and degenerative changes of the spine. 3 mm upper pole left renal calculus.

 

 

 

IMPRESSION:  

1. There is calcification the aortic valve and mild atherosclerotic change of the aorta. No evidence 
of aneurysm.

2. Coronary artery calcifications.

3. Subpleural micronodules have a benign appearance could be followed twelve-month low-dose CT scan a
s clinically warranted.

4. 1.1 cm right thyroid nodule.

5. Severe bilateral glenohumeral joint arthropathy with cystic fluid attenuation along the medial mar
gin of the right shoulder. Possibly related to a large bursal fluid collection. Correlate clinically.


6. 3 mm upper pole left renal calculus.

## 2023-11-22 ENCOUNTER — HOSPITAL ENCOUNTER (OUTPATIENT)
Dept: HOSPITAL 47 - LABWHC1 | Age: 83
Discharge: HOME | End: 2023-11-22
Attending: FAMILY MEDICINE
Payer: MEDICARE

## 2023-11-22 DIAGNOSIS — E07.9: ICD-10-CM

## 2023-11-22 DIAGNOSIS — M81.0: ICD-10-CM

## 2023-11-22 DIAGNOSIS — Z13.6: Primary | ICD-10-CM

## 2023-11-22 LAB
CRP SERPL HS-MCNC: 2.95 MG/L (ref 0–3)
RHEUMATOID FACT SERPL-ACNC: <15 IU/ML (ref 0–15)
T4 FREE SERPL-MCNC: 1.21 NG/DL (ref 0.8–1.8)
URATE SERPL-MCNC: 3.6 MG/DL (ref 2.9–7.7)

## 2023-11-22 PROCEDURE — 86431 RHEUMATOID FACTOR QUANT: CPT

## 2023-11-22 PROCEDURE — 82533 TOTAL CORTISOL: CPT

## 2023-11-22 PROCEDURE — 84439 ASSAY OF FREE THYROXINE: CPT

## 2023-11-22 PROCEDURE — 36415 COLL VENOUS BLD VENIPUNCTURE: CPT

## 2023-11-22 PROCEDURE — 83090 ASSAY OF HOMOCYSTEINE: CPT

## 2023-11-22 PROCEDURE — 86001 ALLERGEN SPECIFIC IGG: CPT

## 2023-11-22 PROCEDURE — 86038 ANTINUCLEAR ANTIBODIES: CPT

## 2023-11-22 PROCEDURE — 84443 ASSAY THYROID STIM HORMONE: CPT

## 2023-11-22 PROCEDURE — 86141 C-REACTIVE PROTEIN HS: CPT

## 2023-11-22 PROCEDURE — 82306 VITAMIN D 25 HYDROXY: CPT

## 2023-11-22 PROCEDURE — 84481 FREE ASSAY (FT-3): CPT

## 2023-11-22 PROCEDURE — 84550 ASSAY OF BLOOD/URIC ACID: CPT

## 2023-11-22 PROCEDURE — 82626 DEHYDROEPIANDROSTERONE: CPT

## 2024-04-19 ENCOUNTER — HOSPITAL ENCOUNTER (OUTPATIENT)
Dept: HOSPITAL 47 - RADNMMAIN | Age: 84
Discharge: HOME | End: 2024-04-19
Attending: FAMILY MEDICINE
Payer: MEDICARE

## 2024-04-19 DIAGNOSIS — R06.00: Primary | ICD-10-CM

## 2024-04-19 PROCEDURE — 78452 HT MUSCLE IMAGE SPECT MULT: CPT

## 2024-04-19 PROCEDURE — 93017 CV STRESS TEST TRACING ONLY: CPT

## 2024-04-19 NOTE — CA
Lexiscan Nuclear Stress Test Report 

 

 Name:    Lori Johnson 

 

 MRN:    [id number] 

 

 Exam Date: 2024 10:36 

 

 Exam Location:      [place]  

                     Stress 

 

 Ht (in):     61     Wt (lb):     158    BSA:    1.71 

 

 Ordering Phys:       Loly Interiano DO 

 

 Referring Phys:      TRAVIS 

 

 Technologist:        Hilario Novoa 

 

 Age:    83    Gender:    F 

 

 :    1940 

 Procedure CPT: 

 

 Indications:              R06.00 Dyspnea 

 

 ICD-10 Codes: 

 

 Patient History:          HTN, HYPERCHOLESTEROLEMIA, FAMILY HX OF  

                           HEART DISEASE 

 

 Medications:              CRESTOR, NORVASC, ASA, LOSARTAN, HZTZ, VIT  

                           B12, POTASSIUM, MELOXICAM 

 

 Meds past 24 hrs: 

 

 Pretest Chest Pain: 

 

 STRESS TEST      Lexiscan 

 

 Protocol 

 

 

 

 

 Exercise Duration (min:sec):         02:00 

 Max ST Depressions (mm): 

 Angina Score: 

 Badillo Score: 

 Resting HR (bpm):      66 

 

 Peak HR (bpm):         102 

 

 Resting BP (mmHg):       120    /   58 

 

 Peak BP (mmHg):       149   /   54 

 

 MPHR:    138     Target HR:      117 

 

 % MPHR:     74 

 METS:  1.0 

 

 Total Dose: 

 Peak Dose: 

 Atropine: 

 Double Product:       [id number] 

 

 BP Response: 

 

 Stress Termination:       INFUSION COMPLETE 

 

 Stress Symptoms: 

 LULY 

 

 Stress Summary: 

 

 

  ECG ANALYSIS 

 

 Resting ECG:     Normal sinus rhythm normal axis normal intervals 

 

 Stress ECG:      Patient received Lexiscan as a protocol did not  

                  have chest pain or diagnostic ST segment  

                  depression 

 

 CONCLUSIONS 

 Negative stress test by EKG criteria 

 Cardiolite portion of the stress test will be reported  

 separately 

 

 Dr. Evin Horton MD 

 (Electronically Signed) 

 Final Date:      2024 11:09

## 2024-04-19 NOTE — NM
EXAMINATION TYPE: NM stress lexiscan cardiolite

 

DATE OF EXAM: 4/19/2024

 

COMPARISON: NONE

 

CLINICAL INDICATION: Female, 83 years old with history of R06.00 DYSPNEA;

 

TECHNIQUE:  After the intravenous administration of 9.9 mCi Tc 99m Sestamibi - Cardiolite resting SPE
CT images acquired 70 minutes post injection. 

 

The patient received 0.4mg Lexiscan, 25.8 mCi Tc 99m Sestamibi - Stress images obtained 38 minutes po
st injection 

 

FINDINGS:  

 

Review of stress and rest SPECT images demonstrates fixed perfusion defect along the inferolateral wa
ll. While SPECT images show a similar appearance, color map suggests some associated reversibility in
 this region. Gated analysis shows normal wall motion with an estimated left ventricular ejection fra
ction of 85 %. TID calculated normal at 0.81, within normal limits.

 

 

 

IMPRESSION:

Fixed defect involving the inferolateral wall, either attenuation artifact or old infarct. Visually, 
rest and stress SPECT images appear similar despite polar map indicating some reversibility in this r
egion. Further evaluation as clinically indicated.

## 2024-10-18 ENCOUNTER — HOSPITAL ENCOUNTER (OUTPATIENT)
Dept: HOSPITAL 47 - RADMAMWWP | Age: 84
Discharge: HOME | End: 2024-10-18
Attending: FAMILY MEDICINE
Payer: MEDICARE

## 2024-10-18 DIAGNOSIS — Z78.0: ICD-10-CM

## 2024-10-18 DIAGNOSIS — Z80.3: ICD-10-CM

## 2024-10-18 DIAGNOSIS — R92.323: ICD-10-CM

## 2024-10-18 DIAGNOSIS — R92.0: ICD-10-CM

## 2024-10-18 DIAGNOSIS — Z12.31: Primary | ICD-10-CM

## 2024-10-18 PROCEDURE — 77063 BREAST TOMOSYNTHESIS BI: CPT

## 2024-10-18 PROCEDURE — 77067 SCR MAMMO BI INCL CAD: CPT

## 2024-10-22 NOTE — MM
Reason for Exam: Screening  (asymptomatic). 

Last screening mammogram was performed 12 month(s) ago.





Patient History: 

Menarche at age 14. First Full-Term Pregnancy at age 19. Left ovary removed at age 32. Hysterectomy

at age 32. Postmenopausal. Patient used Hormonal Contraceptives for 2 years. Unspecified Hormone,

from age 39 until age 46. 10/31/2019, Benign Core Biopsy on the right side.

Paternal cousin had breast cancer, age 60. Maternal cousin had breast cancer, age 60. Daughter had

breast cancer, age 56. 





Risk Values: 

Lili 5 year model risk: 3.3%.

NCI Lifetime model risk: 4.0%.





Prior Study Comparison: 

10/12/2021 Bilateral Screening Mammogram, [facility]. 10/13/2022 Bilateral MG 3D screening mammo w/cad,

[facility]. 10/17/2023 Bilateral MG 3D screening mammo w/cad, [facility]. 





Tissue Density: 

There are scattered areas of fibroglandular density.





Findings: 

Analyzed By CAD. 

Benign bilateral vascular calcifications. Microclip right breast from prior biopsy. Some grouped

12:00 left breast microcalcifications remain unchanged.

There is no suspicious group of microcalcifications or new suspicious mass in either breast. 





Overall Assessment: Benign, BI-RAD 2





Management: 

Screening Mammogram of both breasts in 1 year.

See note below in regards to the patient's increased 5 year Lili score.



Patient should continue monthly self-breast exams.  A clinical breast exam by your physician is

recommended on an annual basis.

This exam should not preclude additional follow-up of suspicious palpable abnormalities.



Note on Lili scores and lifetime risk:

1. A Lili score greater than 3% is considered moderate risk. If this is the case, consider

specialist referral to assess eligibility for a risk reducing agent.

2. If overall lifetime risk for the development of breast cancer is 20% or higher, the patient may

qualify for future screening with alternating mammogram and breast MRI.









[facility], Workstation: [id number], 10/22/2024 6:36 PM.



Electronically signed and approved by: Adryan Glasgow M.D. Radiologist